# Patient Record
(demographics unavailable — no encounter records)

---

## 2024-12-05 NOTE — ASSESSMENT
[FreeTextEntry1] : At this time given history of trauma, questionable scaphoid lucency on x-ray, recommending an MRI left wrist STAT rule out scaphoid fracture.  Patient was placed into a thumb spica fiberglass cast.  Maryjane from gymnastics.  Over-the-counter anti-inflammatories as needed.  Follow-up with Dr. Narvaez after MRI is completed

## 2024-12-05 NOTE — IMAGING
[de-identified] : On examination of the left wrist mild swelling, no ecchymosis, erythema.  Skin is intact.  Patient has tenderness over the scaphoid, did not appear to have tenderness over the radius, no tenderness over the ulnar styloid.  No tenderness over the metacarpals, no tenderness over the fingers.  She is able to make a full fist.  Pain through wrist flexion and extension.  X-ray was reviewed on disc from [facility] 4 views including scaphoid view subtle lucency seen through the scaphoid waist on the scaphoid view.

## 2024-12-05 NOTE — HISTORY OF PRESENT ILLNESS
[de-identified] : 16-year-old female comes in today with her mom for an evaluation of her left wrist pain and injury.  Patient had a fall onto with Went to [facility] had an x-ray done was told she had a scaphoid fracture and given a thumb spica wrist brace.

## 2024-12-13 NOTE — DATA REVIEWED
[FreeTextEntry1] : Her previous radiographs are reviewed documenting a question of abnormality within the scaphoid.  MRI  is reviewed documenting no fracture no dislocation small ganglion cyst.

## 2024-12-13 NOTE — ASSESSMENT
[FreeTextEntry1] : Patient injury to the left wrist.  Her cast was removed there is no fracture no dislocation there is a ganglion cyst that is deep to the carpal tunnel that is asymptomatic she will come out of immobilization range of motion exercises stretching exercises if she begins to feel better she will return back to her high-level gymnastic activities

## 2024-12-13 NOTE — PHYSICAL EXAM
[de-identified] : Patient is in a short arm thumb spica cast.  The cast is removed.  She has some tenderness to palpation along the scapholunate interval no tenderness in the snuffbox nontender along the wrist good range of motion stable range of motion no instabilities no deformities

## 2024-12-13 NOTE — HISTORY OF PRESENT ILLNESS
[de-identified] : 60-year-old female had left-sided wrist pain discomfort.  Ended up with x-rays and then an MRI.  It has been a couple of weeks since the pain and discomfort develop.  She comes in for evaluation today.